# Patient Record
Sex: MALE | Race: ASIAN | URBAN - METROPOLITAN AREA
[De-identification: names, ages, dates, MRNs, and addresses within clinical notes are randomized per-mention and may not be internally consistent; named-entity substitution may affect disease eponyms.]

---

## 2017-06-20 ENCOUNTER — HOSPITAL ENCOUNTER (OUTPATIENT)
Facility: CLINIC | Age: 22
Discharge: HOME | End: 2017-06-20
Attending: EMERGENCY MEDICINE | Admitting: SURGERY
Payer: COMMERCIAL

## 2017-06-20 ENCOUNTER — ANESTHESIA (OUTPATIENT)
Dept: SURGERY | Facility: CLINIC | Age: 22
End: 2017-06-20
Payer: COMMERCIAL

## 2017-06-20 ENCOUNTER — APPOINTMENT (OUTPATIENT)
Dept: ULTRASOUND IMAGING | Facility: CLINIC | Age: 22
End: 2017-06-20
Attending: EMERGENCY MEDICINE
Payer: COMMERCIAL

## 2017-06-20 ENCOUNTER — ANESTHESIA EVENT (OUTPATIENT)
Dept: SURGERY | Facility: CLINIC | Age: 22
End: 2017-06-20
Payer: COMMERCIAL

## 2017-06-20 VITALS
BODY MASS INDEX: 20.35 KG/M2 | SYSTOLIC BLOOD PRESSURE: 120 MMHG | HEART RATE: 60 BPM | RESPIRATION RATE: 16 BRPM | TEMPERATURE: 98.2 F | WEIGHT: 137.38 LBS | OXYGEN SATURATION: 97 % | DIASTOLIC BLOOD PRESSURE: 76 MMHG | HEIGHT: 69 IN

## 2017-06-20 DIAGNOSIS — K35.80 ACUTE APPENDICITIS, UNSPECIFIED ACUTE APPENDICITIS TYPE: ICD-10-CM

## 2017-06-20 LAB
ALBUMIN SERPL-MCNC: 4.7 G/DL (ref 3.4–5)
ALBUMIN UR-MCNC: NEGATIVE MG/DL
ALP SERPL-CCNC: 68 U/L (ref 65–260)
ALT SERPL W P-5'-P-CCNC: 52 U/L (ref 0–50)
AMORPH CRY #/AREA URNS HPF: ABNORMAL /HPF
ANION GAP SERPL CALCULATED.3IONS-SCNC: 11 MMOL/L (ref 3–14)
APPEARANCE UR: ABNORMAL
AST SERPL W P-5'-P-CCNC: 17 U/L (ref 0–35)
BASOPHILS # BLD AUTO: 0 10E9/L (ref 0–0.2)
BASOPHILS NFR BLD AUTO: 0.2 %
BILIRUB SERPL-MCNC: 0.6 MG/DL (ref 0.2–1.3)
BILIRUB UR QL STRIP: NEGATIVE
BUN SERPL-MCNC: 17 MG/DL (ref 7–30)
CALCIUM SERPL-MCNC: 9.5 MG/DL (ref 8.5–10.1)
CHLORIDE SERPL-SCNC: 103 MMOL/L (ref 98–110)
CO2 SERPL-SCNC: 26 MMOL/L (ref 20–32)
COLOR UR AUTO: YELLOW
CREAT SERPL-MCNC: 0.83 MG/DL (ref 0.5–1)
DIFFERENTIAL METHOD BLD: ABNORMAL
EOSINOPHIL # BLD AUTO: 0.1 10E9/L (ref 0–0.7)
EOSINOPHIL NFR BLD AUTO: 0.6 %
ERYTHROCYTE [DISTWIDTH] IN BLOOD BY AUTOMATED COUNT: 11.6 % (ref 10–15)
GFR SERPL CREATININE-BSD FRML MDRD: ABNORMAL ML/MIN/1.7M2
GLUCOSE SERPL-MCNC: 115 MG/DL (ref 70–99)
GLUCOSE UR STRIP-MCNC: NEGATIVE MG/DL
HCT VFR BLD AUTO: 45.4 % (ref 40–53)
HGB BLD-MCNC: 16.5 G/DL (ref 13.3–17.7)
HGB UR QL STRIP: NEGATIVE
IMM GRANULOCYTES # BLD: 0 10E9/L (ref 0–0.4)
IMM GRANULOCYTES NFR BLD: 0.2 %
KETONES UR STRIP-MCNC: 10 MG/DL
LEUKOCYTE ESTERASE UR QL STRIP: NEGATIVE
LIPASE SERPL-CCNC: 121 U/L (ref 73–393)
LYMPHOCYTES # BLD AUTO: 2.1 10E9/L (ref 0.8–5.3)
LYMPHOCYTES NFR BLD AUTO: 14.6 %
MCH RBC QN AUTO: 30.9 PG (ref 26.5–33)
MCHC RBC AUTO-ENTMCNC: 36.3 G/DL (ref 31.5–36.5)
MCV RBC AUTO: 85 FL (ref 78–100)
MONOCYTES # BLD AUTO: 0.8 10E9/L (ref 0–1.3)
MONOCYTES NFR BLD AUTO: 5.7 %
MUCOUS THREADS #/AREA URNS LPF: PRESENT /LPF
NEUTROPHILS # BLD AUTO: 11.4 10E9/L (ref 1.6–8.3)
NEUTROPHILS NFR BLD AUTO: 78.7 %
NITRATE UR QL: NEGATIVE
NRBC # BLD AUTO: 0 10*3/UL
NRBC BLD AUTO-RTO: 0 /100
PH UR STRIP: 6 PH (ref 5–7)
PLATELET # BLD AUTO: 196 10E9/L (ref 150–450)
POTASSIUM SERPL-SCNC: 3.5 MMOL/L (ref 3.4–5.3)
PROT SERPL-MCNC: 8.2 G/DL (ref 6.8–8.8)
RBC # BLD AUTO: 5.34 10E12/L (ref 4.4–5.9)
RBC #/AREA URNS AUTO: 1 /HPF (ref 0–2)
SODIUM SERPL-SCNC: 140 MMOL/L (ref 133–144)
SP GR UR STRIP: 1.02 (ref 1–1.03)
URN SPEC COLLECT METH UR: ABNORMAL
UROBILINOGEN UR STRIP-MCNC: NORMAL MG/DL (ref 0–2)
WBC # BLD AUTO: 14.5 10E9/L (ref 4–11)
WBC #/AREA URNS AUTO: 1 /HPF (ref 0–2)

## 2017-06-20 PROCEDURE — 25000128 H RX IP 250 OP 636: Performed by: EMERGENCY MEDICINE

## 2017-06-20 PROCEDURE — 25000125 ZZHC RX 250: Performed by: ANESTHESIOLOGY

## 2017-06-20 PROCEDURE — S0020 INJECTION, BUPIVICAINE HYDRO: HCPCS | Performed by: SURGERY

## 2017-06-20 PROCEDURE — C9399 UNCLASSIFIED DRUGS OR BIOLOG: HCPCS | Performed by: NURSE ANESTHETIST, CERTIFIED REGISTERED

## 2017-06-20 PROCEDURE — 83690 ASSAY OF LIPASE: CPT | Performed by: EMERGENCY MEDICINE

## 2017-06-20 PROCEDURE — 25000132 ZZH RX MED GY IP 250 OP 250 PS 637: Performed by: EMERGENCY MEDICINE

## 2017-06-20 PROCEDURE — 40000170 ZZH STATISTIC PRE-PROCEDURE ASSESSMENT II: Performed by: SURGERY

## 2017-06-20 PROCEDURE — 25000128 H RX IP 250 OP 636: Performed by: NURSE ANESTHETIST, CERTIFIED REGISTERED

## 2017-06-20 PROCEDURE — 71000014 ZZH RECOVERY PHASE 1 LEVEL 2 FIRST HR: Performed by: SURGERY

## 2017-06-20 PROCEDURE — 27210794 ZZH OR GENERAL SUPPLY STERILE: Performed by: SURGERY

## 2017-06-20 PROCEDURE — 88304 TISSUE EXAM BY PATHOLOGIST: CPT | Mod: 26 | Performed by: SURGERY

## 2017-06-20 PROCEDURE — 85025 COMPLETE CBC W/AUTO DIFF WBC: CPT | Performed by: EMERGENCY MEDICINE

## 2017-06-20 PROCEDURE — 88304 TISSUE EXAM BY PATHOLOGIST: CPT | Performed by: SURGERY

## 2017-06-20 PROCEDURE — 81001 URINALYSIS AUTO W/SCOPE: CPT | Performed by: EMERGENCY MEDICINE

## 2017-06-20 PROCEDURE — 80053 COMPREHEN METABOLIC PANEL: CPT | Performed by: EMERGENCY MEDICINE

## 2017-06-20 PROCEDURE — 36000057 ZZH SURGERY LEVEL 3 1ST 30 MIN - UMMC: Performed by: SURGERY

## 2017-06-20 PROCEDURE — 37000008 ZZH ANESTHESIA TECHNICAL FEE, 1ST 30 MIN: Performed by: SURGERY

## 2017-06-20 PROCEDURE — 25000566 ZZH SEVOFLURANE, EA 15 MIN: Performed by: SURGERY

## 2017-06-20 PROCEDURE — 71000027 ZZH RECOVERY PHASE 2 EACH 15 MINS: Performed by: SURGERY

## 2017-06-20 PROCEDURE — 99285 EMERGENCY DEPT VISIT HI MDM: CPT | Mod: 25 | Performed by: EMERGENCY MEDICINE

## 2017-06-20 PROCEDURE — 25000125 ZZHC RX 250: Performed by: SURGERY

## 2017-06-20 PROCEDURE — 36000059 ZZH SURGERY LEVEL 3 EA 15 ADDTL MIN UMMC: Performed by: SURGERY

## 2017-06-20 PROCEDURE — 96360 HYDRATION IV INFUSION INIT: CPT | Performed by: EMERGENCY MEDICINE

## 2017-06-20 PROCEDURE — 96361 HYDRATE IV INFUSION ADD-ON: CPT | Mod: XU | Performed by: EMERGENCY MEDICINE

## 2017-06-20 PROCEDURE — 37000009 ZZH ANESTHESIA TECHNICAL FEE, EACH ADDTL 15 MIN: Performed by: SURGERY

## 2017-06-20 PROCEDURE — 25000128 H RX IP 250 OP 636: Performed by: SURGERY

## 2017-06-20 PROCEDURE — 76705 ECHO EXAM OF ABDOMEN: CPT

## 2017-06-20 PROCEDURE — 99285 EMERGENCY DEPT VISIT HI MDM: CPT | Mod: Z6 | Performed by: EMERGENCY MEDICINE

## 2017-06-20 PROCEDURE — 25000125 ZZHC RX 250: Performed by: NURSE ANESTHETIST, CERTIFIED REGISTERED

## 2017-06-20 RX ORDER — MEPERIDINE HYDROCHLORIDE 25 MG/ML
12.5 INJECTION INTRAMUSCULAR; INTRAVENOUS; SUBCUTANEOUS
Status: DISCONTINUED | OUTPATIENT
Start: 2017-06-20 | End: 2017-06-20 | Stop reason: HOSPADM

## 2017-06-20 RX ORDER — IBUPROFEN 600 MG/1
600 TABLET, FILM COATED ORAL ONCE
Status: COMPLETED | OUTPATIENT
Start: 2017-06-20 | End: 2017-06-20

## 2017-06-20 RX ORDER — ONDANSETRON 4 MG/1
4 TABLET, ORALLY DISINTEGRATING ORAL EVERY 30 MIN PRN
Status: DISCONTINUED | OUTPATIENT
Start: 2017-06-20 | End: 2017-06-20 | Stop reason: HOSPADM

## 2017-06-20 RX ORDER — FENTANYL CITRATE 50 UG/ML
25-50 INJECTION, SOLUTION INTRAMUSCULAR; INTRAVENOUS
Status: DISCONTINUED | OUTPATIENT
Start: 2017-06-20 | End: 2017-06-20 | Stop reason: HOSPADM

## 2017-06-20 RX ORDER — DEXAMETHASONE SODIUM PHOSPHATE 4 MG/ML
INJECTION, SOLUTION INTRA-ARTICULAR; INTRALESIONAL; INTRAMUSCULAR; INTRAVENOUS; SOFT TISSUE PRN
Status: DISCONTINUED | OUTPATIENT
Start: 2017-06-20 | End: 2017-06-20

## 2017-06-20 RX ORDER — CEFAZOLIN SODIUM 2 G/100ML
2 INJECTION, SOLUTION INTRAVENOUS
Status: COMPLETED | OUTPATIENT
Start: 2017-06-20 | End: 2017-06-20

## 2017-06-20 RX ORDER — NALOXONE HYDROCHLORIDE 0.4 MG/ML
.1-.4 INJECTION, SOLUTION INTRAMUSCULAR; INTRAVENOUS; SUBCUTANEOUS
Status: DISCONTINUED | OUTPATIENT
Start: 2017-06-20 | End: 2017-06-20 | Stop reason: HOSPADM

## 2017-06-20 RX ORDER — BUPIVACAINE HYDROCHLORIDE 5 MG/ML
INJECTION, SOLUTION PERINEURAL PRN
Status: DISCONTINUED | OUTPATIENT
Start: 2017-06-20 | End: 2017-06-20 | Stop reason: HOSPADM

## 2017-06-20 RX ORDER — LIDOCAINE HYDROCHLORIDE 20 MG/ML
INJECTION, SOLUTION INFILTRATION; PERINEURAL PRN
Status: DISCONTINUED | OUTPATIENT
Start: 2017-06-20 | End: 2017-06-20

## 2017-06-20 RX ORDER — PROPOFOL 10 MG/ML
INJECTION, EMULSION INTRAVENOUS PRN
Status: DISCONTINUED | OUTPATIENT
Start: 2017-06-20 | End: 2017-06-20

## 2017-06-20 RX ORDER — ONDANSETRON 2 MG/ML
INJECTION INTRAMUSCULAR; INTRAVENOUS PRN
Status: DISCONTINUED | OUTPATIENT
Start: 2017-06-20 | End: 2017-06-20

## 2017-06-20 RX ORDER — CEFAZOLIN SODIUM 1 G/3ML
1 INJECTION, POWDER, FOR SOLUTION INTRAMUSCULAR; INTRAVENOUS SEE ADMIN INSTRUCTIONS
Status: DISCONTINUED | OUTPATIENT
Start: 2017-06-20 | End: 2017-06-20 | Stop reason: HOSPADM

## 2017-06-20 RX ORDER — SODIUM CHLORIDE, SODIUM LACTATE, POTASSIUM CHLORIDE, CALCIUM CHLORIDE 600; 310; 30; 20 MG/100ML; MG/100ML; MG/100ML; MG/100ML
INJECTION, SOLUTION INTRAVENOUS CONTINUOUS PRN
Status: DISCONTINUED | OUTPATIENT
Start: 2017-06-20 | End: 2017-06-20

## 2017-06-20 RX ORDER — KETOROLAC TROMETHAMINE 30 MG/ML
INJECTION, SOLUTION INTRAMUSCULAR; INTRAVENOUS PRN
Status: DISCONTINUED | OUTPATIENT
Start: 2017-06-20 | End: 2017-06-20

## 2017-06-20 RX ORDER — SODIUM CHLORIDE, SODIUM LACTATE, POTASSIUM CHLORIDE, CALCIUM CHLORIDE 600; 310; 30; 20 MG/100ML; MG/100ML; MG/100ML; MG/100ML
INJECTION, SOLUTION INTRAVENOUS CONTINUOUS
Status: DISCONTINUED | OUTPATIENT
Start: 2017-06-20 | End: 2017-06-20 | Stop reason: HOSPADM

## 2017-06-20 RX ORDER — HYDROCODONE BITARTRATE AND ACETAMINOPHEN 5; 325 MG/1; MG/1
1-2 TABLET ORAL EVERY 4 HOURS PRN
Qty: 20 TABLET | Refills: 0 | Status: SHIPPED | OUTPATIENT
Start: 2017-06-20

## 2017-06-20 RX ORDER — ONDANSETRON 2 MG/ML
4 INJECTION INTRAMUSCULAR; INTRAVENOUS EVERY 30 MIN PRN
Status: DISCONTINUED | OUTPATIENT
Start: 2017-06-20 | End: 2017-06-20 | Stop reason: HOSPADM

## 2017-06-20 RX ORDER — FENTANYL CITRATE 50 UG/ML
50 INJECTION, SOLUTION INTRAMUSCULAR; INTRAVENOUS
Status: COMPLETED | OUTPATIENT
Start: 2017-06-20 | End: 2017-06-20

## 2017-06-20 RX ORDER — OXYCODONE HYDROCHLORIDE 5 MG/1
5-10 TABLET ORAL
Status: DISCONTINUED | OUTPATIENT
Start: 2017-06-20 | End: 2017-06-20 | Stop reason: HOSPADM

## 2017-06-20 RX ORDER — SODIUM CHLORIDE 9 MG/ML
1000 INJECTION, SOLUTION INTRAVENOUS CONTINUOUS
Status: DISCONTINUED | OUTPATIENT
Start: 2017-06-20 | End: 2017-06-20 | Stop reason: HOSPADM

## 2017-06-20 RX ORDER — FENTANYL CITRATE 50 UG/ML
INJECTION, SOLUTION INTRAMUSCULAR; INTRAVENOUS PRN
Status: DISCONTINUED | OUTPATIENT
Start: 2017-06-20 | End: 2017-06-20

## 2017-06-20 RX ADMIN — Medication 5 MG: at 13:12

## 2017-06-20 RX ADMIN — FENTANYL CITRATE 50 MCG: 50 INJECTION, SOLUTION INTRAMUSCULAR; INTRAVENOUS at 12:08

## 2017-06-20 RX ADMIN — ONDANSETRON 4 MG: 2 INJECTION INTRAMUSCULAR; INTRAVENOUS at 13:19

## 2017-06-20 RX ADMIN — LIDOCAINE HYDROCHLORIDE 100 MG: 20 INJECTION, SOLUTION INFILTRATION; PERINEURAL at 13:12

## 2017-06-20 RX ADMIN — SUGAMMADEX 200 MG: 100 INJECTION, SOLUTION INTRAVENOUS at 13:54

## 2017-06-20 RX ADMIN — CEFAZOLIN SODIUM 2 G: 2 INJECTION, SOLUTION INTRAVENOUS at 13:15

## 2017-06-20 RX ADMIN — IBUPROFEN 600 MG: 600 TABLET ORAL at 08:41

## 2017-06-20 RX ADMIN — SODIUM CHLORIDE, POTASSIUM CHLORIDE, SODIUM LACTATE AND CALCIUM CHLORIDE: 600; 310; 30; 20 INJECTION, SOLUTION INTRAVENOUS at 13:41

## 2017-06-20 RX ADMIN — DEXAMETHASONE SODIUM PHOSPHATE 10 MG: 4 INJECTION, SOLUTION INTRAMUSCULAR; INTRAVENOUS at 13:19

## 2017-06-20 RX ADMIN — SODIUM CHLORIDE, POTASSIUM CHLORIDE, SODIUM LACTATE AND CALCIUM CHLORIDE: 600; 310; 30; 20 INJECTION, SOLUTION INTRAVENOUS at 13:00

## 2017-06-20 RX ADMIN — SODIUM CHLORIDE 1000 ML: 9 INJECTION, SOLUTION INTRAVENOUS at 10:50

## 2017-06-20 RX ADMIN — PROPOFOL 200 MG: 10 INJECTION, EMULSION INTRAVENOUS at 13:13

## 2017-06-20 RX ADMIN — SODIUM CHLORIDE 1000 ML: 9 INJECTION, SOLUTION INTRAVENOUS at 08:41

## 2017-06-20 RX ADMIN — KETOROLAC TROMETHAMINE 30 MG: 30 INJECTION, SOLUTION INTRAMUSCULAR at 13:36

## 2017-06-20 RX ADMIN — Medication 25 MG: at 13:27

## 2017-06-20 RX ADMIN — MIDAZOLAM HYDROCHLORIDE 1 MG: 1 INJECTION, SOLUTION INTRAMUSCULAR; INTRAVENOUS at 13:12

## 2017-06-20 RX ADMIN — FENTANYL CITRATE 150 MCG: 50 INJECTION, SOLUTION INTRAMUSCULAR; INTRAVENOUS at 13:29

## 2017-06-20 RX ADMIN — MIDAZOLAM HYDROCHLORIDE 1 MG: 1 INJECTION, SOLUTION INTRAMUSCULAR; INTRAVENOUS at 13:07

## 2017-06-20 RX ADMIN — FENTANYL CITRATE 50 MCG: 50 INJECTION, SOLUTION INTRAMUSCULAR; INTRAVENOUS at 12:09

## 2017-06-20 ASSESSMENT — ENCOUNTER SYMPTOMS
MUSCULOSKELETAL NEGATIVE: 1
SHORTNESS OF BREATH: 0
VOMITING: 0
ABDOMINAL PAIN: 1
HEADACHES: 0
FEVER: 0
PSYCHIATRIC NEGATIVE: 1
NAUSEA: 0
EYES NEGATIVE: 1

## 2017-06-20 NOTE — IP AVS SNAPSHOT
MRN:[id number]                      After Visit Summary   6/20/2017    Elena Salguero    MRN: [id number]           Thank you!     Thank you for choosing [facility] for your care. Our goal is always to provide you with excellent care. Hearing back from our patients is one way we can continue to improve our services. Please take a few minutes to complete the written survey that you may receive in the mail after you visit with us. Thank you!        Patient Information     Date Of Birth          1995        About your hospital stay     You were admitted on:  N/A You last received care in the:  [facility] OR    You were discharged on:  June 20, 2017       Who to Call     For medical emergencies, please call 911.  For non-urgent questions about your medical care, please call your primary care provider or clinic, [phone]  For questions related to your surgery, please call your surgery clinic        Attending Provider     Provider Shayna Cedillo MD Emergency Medicine       Primary Care Provider Office Phone # Fax #    Kalie Redmond [phone]      After Care Instructions     Discharge Instructions       Follow up appointment as instructed by Surgeon and or RN, expect a call within 2-4 business days for post op follow up plan            No lifting       No lifting over 20 lbs and no strenuous physical activity for 3 weeks            Shower       No shower for 24 hours post procedure. May shower Postoperative Day (POD)  1                  Further instructions from your care team       [facility]  Same-Day Surgery   Adult Discharge Orders & Instructions     For 24 hours after surgery    1. Get plenty of rest.  A responsible adult must stay with you for at least 24 hours after you leave the hospital.   2. Do not drive or use heavy equipment.  If you have weakness or tingling, don't drive or use heavy equipment until this  feeling goes away.  3. Do not drink alcohol.  4. Avoid strenuous or risky activities.  Ask for help when climbing stairs.   5. You may feel lightheaded.  IF so, sit for a few minutes before standing.  Have someone help you get up.   6. If you have nausea (feel sick to your stomach): Drink only clear liquids such as apple juice, ginger ale, broth or 7-Up.  Rest may also help.  Be sure to drink enough fluids.  Move to a regular diet as you feel able.  7. You may have a slight fever. Call the doctor if your fever is over 100 F (37.7 C) (taken under the tongue) or lasts longer than 24 hours.  8. You may have a dry mouth, a sore throat, muscle aches or trouble sleeping.  These should go away after 24 hours.  9. Do not make important or legal decisions.   Call your doctor for any of the followin.  Signs of infection (fever, growing tenderness at the surgery site, a large amount of drainage or bleeding, severe pain, foul-smelling drainage, redness, swelling).    2. It has been over 8 to 10 hours since surgery and you are still not able to urinate (pass water).    3.  Headache for over 24 hours.    4.  Numbness, tingling or weakness the day after surgery (if you had spinal anesthesia).  To contact a doctor, call ________________________________________ or:        [phone] and ask for the resident on call for   ______________________________________________ (answered 24 hours a day)      Emergency Department:    [facility]: [phone]       (TTY for hearing impaired: [phone])    [facility]: [phone]       (TTY for hearing impaired: [phone])    Discharge Instructions: Following a Laparoscopy    Comfort:    The amount of discomfort you can expect is very unpredictable.     If you have pain that cannot be controlled with non aspirin medication or with the prescription medication you may have received, you should notify your physician.     You May Experience:    Abdominal tenderness or  "abdominal cramping    Low back ache or discomfort radiating to your shoulders, chest, back or neck. This is a result of the gas used to inflate your abdomen during surgery. This gas is absorbed in 24 to 36 hours. The \"knee chest\" position will help relieve this discomfort.    Black and blue marks (bruising) on your abdomen from the instruments used during the surgery.     Drainage:    You may expect a small amount of drainage from the incision on your abdomen and you may change the bandage when necessary.    If the drainage increases or does not stop by holding pressure on the site for a few minutes, call your surgeon's office or go to the Emergency Room.    Home Activity:    The day of surgery spend a quiet day at home.    Increase activity as tolerated.    You may bathe or shower, do not soak in bath tub or scrub incisions.    You have no restrictions on your diet. Following surgery, drink plenty of fluids and eat a light meal.    The anesthesia may produce some nausea. If you feel nauseated, stay in bed, keep your head down and try drinking fluids such as Seven-Up, tea or soup.    Notify Physician at Once If:    You have a fever over 100 degrees. A low grade fever (under 100 degrees) can be common after surgery.    You have severe pain that is not controlled with pain medications prescribed by your surgeon.    You have a large amount of bleeding or drainage.    Follow up for a post operative check as directed by your surgeon.           Pending Results     No orders found from 6/18/2017 to 6/21/2017.            Admission Information     Date & Time Department Dept. Phone    6/20/2017  MAIN [phone]      Your Vitals Were     Blood Pressure Pulse Temperature Respirations Height Weight    126/84 60 97.1  F (36.2  C) (Oral) 16 1.753 m (5' 9\") 62.3 kg (137 lb 6 oz)    Pulse Oximetry BMI (Body Mass Index)                99% 20.29 kg/m2          [employer] Information     [employer] lets you send messages to your " "doctor, view your test results, renew your prescriptions, schedule appointments and more. To sign up, go to [email or web address] . Click on \"Log in\" on the left side of the screen, which will take you to the Welcome page. Then click on \"Sign up Now\" on the right side of the page.     You will be asked to enter the access code listed below, as well as some personal information. Please follow the directions to create your username and password.     Your access code is: [email or web address]  Expires: 2017  2:02 PM     Your access code will  in 90 days. If you need help or a new code, please call your [place] clinic or [phone].        Care EveryWhere ID     This is your Care EveryWhere ID. This could be used by other organizations to access your [place] medical records  [id number]           Review of your medicines      START taking        Dose / Directions    HYDROcodone-acetaminophen 5-325 MG per tablet   Commonly known as:  NORCO   Used for:  Acute appendicitis, unspecified acute appendicitis type        Dose:  1-2 tablet   Take 1-2 tablets by mouth every 4 hours as needed for other (Moderate to Severe Pain)   Quantity:  20 tablet   Refills:  0            Where to get your medicines      Some of these will need a paper prescription and others can be bought over the counter. Ask your nurse if you have questions.     Bring a paper prescription for each of these medications     HYDROcodone-acetaminophen 5-325 MG per tablet                Protect others around you: Learn how to safely use, store and throw away your medicines at www.disposemymeds.org.             Medication List: This is a list of all your medications and when to take them. Check marks below indicate your daily home schedule. Keep this list as a reference.      Medications           Morning Afternoon Evening Bedtime As Needed    HYDROcodone-acetaminophen 5-325 MG per tablet   Commonly known as:  NORCO   Take 1-2 tablets by mouth every 4 " hours as needed for other (Moderate to Severe Pain)

## 2017-06-20 NOTE — ED PROVIDER NOTES
"  History     Chief Complaint   Patient presents with     Abdominal Pain     HPI  Elena Zepeda is a 21 year old male from [facility] who presents with stomach pain.  The pain is around his belly button and started about 9 PM.  He has no associated symptoms such as nausea, vomiting, or fever.  The pain started around his belly button is still there.  He states he would eat if he were offered something.  He is fasting for Ramadan and according to his father, he does not eat or drink anything between 3:30 AM and 9 PM daily during this time.  The patient had a hard bowel movement at 3 AM but denies diarrhea.  Nothing particular makes his pain better or worse.  He is unable to describe the pain other than it is always there and may be it is sharp.  He has never had pain like this in the past.  He denies chest pain or shortness of breath.    Social: Patient is here with his father.  He is from [facility].  He does not smoke.  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      I have reviewed the Medications, Allergies, Past Medical and Surgical History, and Social History in the Epic system.    Review of Systems   Constitutional: Negative for fever.   Eyes: Negative.    Respiratory: Negative for shortness of breath.    Cardiovascular: Negative for chest pain.   Gastrointestinal: Positive for abdominal pain. Negative for nausea and vomiting.   Genitourinary: Negative.    Musculoskeletal: Negative.    Skin: Negative for rash.   Neurological: Negative for headaches.   Psychiatric/Behavioral: Negative.    All other systems reviewed and are negative.      Physical Exam   BP: 137/75  Pulse: 55  Temp: 97.9  F (36.6  C)  Resp: 16  Height: 175.3 cm (5' 9\")  Weight: 62.3 kg (137 lb 6 oz)  SpO2: 99 %  Physical Exam  Physical Exam   Constitutional:   well nourished, well developed, resting comfortably   HENT:   Head: Normocephalic and atraumatic.   Eyes: Conjunctivae are normal. Pupils are equal, round, and reactive to light.    " pharynx has no erythema or exudate, mucous membranes are moist  Neck:   no adenopathy, no bony tenderness  Cardiovascular: regular rate and rhythm without murmurs or gallops  Pulmonary/Chest: Clear to auscultation bilaterally, with no wheezes or retractions. No respiratory distress.  GI: Soft with good bowel sounds.  Periumbilical tenderness, non-distended, with no guarding, no rebound, no peritoneal signs.  No right lower quadrant abdominal pain.  Back:  No bony or CVA tenderness   Musculoskeletal:  no edema or clubbing   Skin: Skin is warm and dry. No rash noted.   Neurological: alert and oriented to person, place, and time. Nonfocal exam  Psychiatric:  normal mood and affect.   ED Course     ED Course     Procedures             Critical Care time:  none             Results for orders placed or performed during the hospital encounter of 06/20/17 (from the past 24 hour(s))   CBC with platelets differential   Result Value Ref Range    WBC 14.5 (H) 4.0 - 11.0 10e9/L    RBC Count 5.34 4.4 - 5.9 10e12/L    Hemoglobin 16.5 13.3 - 17.7 g/dL    Hematocrit 45.4 40.0 - 53.0 %    MCV 85 78 - 100 fl    MCH 30.9 26.5 - 33.0 pg    MCHC 36.3 31.5 - 36.5 g/dL    RDW 11.6 10.0 - 15.0 %    Platelet Count 196 150 - 450 10e9/L    Diff Method Automated Method     % Neutrophils 78.7 %    % Lymphocytes 14.6 %    % Monocytes 5.7 %    % Eosinophils 0.6 %    % Basophils 0.2 %    % Immature Granulocytes 0.2 %    Nucleated RBCs 0 0 /100    Absolute Neutrophil 11.4 (H) 1.6 - 8.3 10e9/L    Absolute Lymphocytes 2.1 0.8 - 5.3 10e9/L    Absolute Monocytes 0.8 0.0 - 1.3 10e9/L    Absolute Eosinophils 0.1 0.0 - 0.7 10e9/L    Absolute Basophils 0.0 0.0 - 0.2 10e9/L    Abs Immature Granulocytes 0.0 0 - 0.4 10e9/L    Absolute Nucleated RBC 0.0    Comprehensive metabolic panel   Result Value Ref Range    Sodium 140 133 - 144 mmol/L    Potassium 3.5 3.4 - 5.3 mmol/L    Chloride 103 98 - 110 mmol/L    Carbon Dioxide 26 20 - 32 mmol/L    Anion Gap 11 3 -  14 mmol/L    Glucose 115 (H) 70 - 99 mg/dL    Urea Nitrogen 17 7 - 30 mg/dL    Creatinine 0.83 0.50 - 1.00 mg/dL    GFR Estimate >90  Non  GFR Calc   >60 mL/min/1.7m2    GFR Estimate If Black >90   GFR Calc   >60 mL/min/1.7m2    Calcium 9.5 8.5 - 10.1 mg/dL    Bilirubin Total 0.6 0.2 - 1.3 mg/dL    Albumin 4.7 3.4 - 5.0 g/dL    Protein Total 8.2 6.8 - 8.8 g/dL    Alkaline Phosphatase 68 65 - 260 U/L    ALT 52 (H) 0 - 50 U/L    AST 17 0 - 35 U/L   Lipase   Result Value Ref Range    Lipase 121 73 - 393 U/L   UA reflex to Microscopic and Culture   Result Value Ref Range    Color Urine Yellow     Appearance Urine Slightly Cloudy     Glucose Urine Negative NEG mg/dL    Bilirubin Urine Negative NEG    Ketones Urine 10 (A) NEG mg/dL    Specific Gravity Urine 1.017 1.003 - 1.035    Blood Urine Negative NEG    pH Urine 6.0 5.0 - 7.0 pH    Protein Albumin Urine Negative NEG mg/dL    Urobilinogen mg/dL Normal 0.0 - 2.0 mg/dL    Nitrite Urine Negative NEG    Leukocyte Esterase Urine Negative NEG    Source Midstream Urine     RBC Urine 1 0 - 2 /HPF    WBC Urine 1 0 - 2 /HPF    Mucous Urine Present (A) NEG /LPF    Amorphous Crystals Few (A) NEG /HPF   US Abdomen Limited    Narrative    ULTRASOUND ABDOMEN LIMITED  6/20/2017 10:08 AM     HISTORY: Periumbilical pain. Rule out appendicitis.     FINDINGS: Ultrasound evaluation right lower quadrant in area of  patient's pain is performed. A dilated noncompressible fluid and  debris-filled tubular structure is noted in the right lower quadrant  in the area of patient's pain likely representing a dilated appendix.  This measures up to 12 mm in thickness. Trace amount of adjacent fluid  is noted. Shadowing calcified appendicolith is noted near the base of  this inflamed appendix. Findings are consistent with acute  appendicitis.      Impression    IMPRESSION: Acute appendicitis.    NELLIE PARNELL MD      Labs Ordered and Resulted from Time of ED Arrival Up to  the Time of Departure from the ED   CBC WITH PLATELETS DIFFERENTIAL - Abnormal; Notable for the following:        Result Value    WBC 14.5 (*)     Absolute Neutrophil 11.4 (*)     All other components within normal limits   COMPREHENSIVE METABOLIC PANEL - Abnormal; Notable for the following:     Glucose 115 (*)     ALT 52 (*)     All other components within normal limits   UA MACROSCOPIC WITH REFLEX TO MICRO AND CULTURE - Abnormal; Notable for the following:     Ketones Urine 10 (*)     Mucous Urine Present (*)     Amorphous Crystals Few (*)     All other components within normal limits   LIPASE           Medications   0.9% sodium chloride infusion (not administered)   0.9% sodium chloride BOLUS (1,000 mLs Intravenous New Bag 6/20/17 0841)   ibuprofen (ADVIL/MOTRIN) tablet 600 mg (600 mg Oral Given 6/20/17 0841)        Assessments & Plan (with Medical Decision Making)         I have reviewed the nursing notes.  Emergency Department course:  The patient was seen and examined at 0800 am.   The patient has stomach pain that is periumbilical with no associated symptoms.  He has no peritoneal signs.  This could obviously represent early appendicitis; however the patient is quite early in presentation.    I treated him with normal saline IV and ibuprofen po.  He then received normal saline at 125 cc an hour  Laboratory studies show  leukocytosis with a WBC of 14.5.  CBC and comprehensive metabolic panel are otherwise unremarkable apart from a slighlty elevated ALT of 52.  Lipase is 121.  UA is negative with 10 ketones.  I reevaluated the patient at about 9:30 AM.  He continued to have abdominal pain it is located periumbilically.  I obtained an ultrasound to see if this would be able to evaluate for acute appendicitis.  Ultrasound shows a dilated appendix measuring up to 12 mm in thickness with an appendicolith.  This is consistent with acute appendicitis.  I consulted surgery and spoke with Dr. Neri.  He will take the  patient to the OR directly from the emergency department.  The patient was made NPO.  He was taken to the OR at about 1140 am      I have reviewed the findings, diagnosis, plan and need for follow up with the patient.    New Prescriptions    No medications on file       Final diagnoses:   Acute appendicitis, unspecified acute appendicitis type       6/20/2017   [facility] [place], EMERGENCY DEPARTMENT  This note was created in part by the use of Dragon voice recognition dictation system. Inadvertent grammatical errors and typographical errors may still exist.  MD Court Thayer Alda L, MD  06/20/17 8883

## 2017-06-20 NOTE — IP AVS SNAPSHOT
MAIN OR    [address]    [place] [address]    Phone:  [phone]                                       After Visit Summary   6/20/2017    Elena Salguero    MRN: [id number]           After Visit Summary Signature Page     I have received my discharge instructions, and my questions have been answered. I have discussed any challenges I see with this plan with the nurse or doctor.    ..........................................................................................................................................  Patient/Patient Representative Signature      ..........................................................................................................................................  Patient Representative Print Name and Relationship to Patient    ..................................................               ................................................  Date                                            Time    ..........................................................................................................................................  Reviewed by Signature/Title    ...................................................              ..............................................  Date                                                            Time

## 2017-06-20 NOTE — BRIEF OP NOTE
[facility] Brief Operative Note    Pre-operative diagnosis: appendicitis   Post-operative diagnosis Same as Pre-op Dx   Procedure: Procedure(s):  Laparoscopic Appendectomy - Wound Class: III-Contaminated   Surgeon: Ozzy Neri MD   Assistants(s):    Estimated blood loss: Minimal    Specimens: appy   Findings: Acute appy

## 2017-06-20 NOTE — H&P
Patient with 24 hours abdominal pain that localized to the RLQ. Denies N/V. No change in bowel habits.  Past medical surgical history negative. No previous surgeries.  meds none  NKDA  Denies EtoH or tobacco.  Lives with family, her with father.  10 pt ROS negative except for HPI  PHYSICAL EXAM  General appearance- healthy, alert, and in no distress.  Skin- Skin color, texture, and turgor normal.  No rashes.  Neck- Neck is supple with no obvious adenopathy.  Lungs- Respiratory effort unlabored.  Gait- Normal.  Abdomen - tender RLQ with rebound.    WBC 14.5  CT abdomen appendicitis    Impression: Appendicitis in healthy 22 y/o patient  Recommend: lap appy under GA  Today discussed alternatives, risks, goals, potential complications for lap appy. Patient understood had opportunity for questions and gives consent to proceed.  NPO, routine orders.

## 2017-06-20 NOTE — ANESTHESIA CARE TRANSFER NOTE
Patient: Elena Salguero    Procedure(s):  Laparoscopic Appendectomy - Wound Class: III-Contaminated    Diagnosis: appendicitis  Diagnosis Additional Information: No value filed.    Anesthesia Type:   General, RSI     Note:  Airway :Face Mask  Patient transferred to:PACU  Comments: Report to KELLY Velásquez.  Pt opening eyes.  119/64  112  `16, clear, SaO2  100 %   Temp 36.8 C  O2 FM      Vitals: (Last set prior to Anesthesia Care Transfer)    CRNA VITALS  6/20/2017 1348 - 6/20/2017 1426      6/20/2017             EKG: Sinus rhythm                Electronically Signed By: ROBERTO Hodgson CRNA  June 20, 2017  2:26 PM

## 2017-06-20 NOTE — DISCHARGE INSTRUCTIONS
[facility]  Same-Day Surgery   Adult Discharge Orders & Instructions     For 24 hours after surgery    1. Get plenty of rest.  A responsible adult must stay with you for at least 24 hours after you leave the hospital.   2. Do not drive or use heavy equipment.  If you have weakness or tingling, don't drive or use heavy equipment until this feeling goes away.  3. Do not drink alcohol.  4. Avoid strenuous or risky activities.  Ask for help when climbing stairs.   5. You may feel lightheaded.  IF so, sit for a few minutes before standing.  Have someone help you get up.   6. If you have nausea (feel sick to your stomach): Drink only clear liquids such as apple juice, ginger ale, broth or 7-Up.  Rest may also help.  Be sure to drink enough fluids.  Move to a regular diet as you feel able.  7. You may have a slight fever. Call the doctor if your fever is over 100 F (37.7 C) (taken under the tongue) or lasts longer than 24 hours.  8. You may have a dry mouth, a sore throat, muscle aches or trouble sleeping.  These should go away after 24 hours.  9. Do not make important or legal decisions.   Call your doctor for any of the followin.  Signs of infection (fever, growing tenderness at the surgery site, a large amount of drainage or bleeding, severe pain, foul-smelling drainage, redness, swelling).    2. It has been over 8 to 10 hours since surgery and you are still not able to urinate (pass water).    3.  Headache for over 24 hours.    4.  Numbness, tingling or weakness the day after surgery (if you had spinal anesthesia).  To contact a doctor, call ________________________________________ or:        [phone] and ask for the resident on call for   ______________________________________________ (answered 24 hours a day)      Emergency Department:    [facility]: [phone]       (TTY for hearing impaired: [phone])    [facility]: [phone]       (TTY for  "hearing impaired: [phone])    Discharge Instructions: Following a Laparoscopy    Comfort:    The amount of discomfort you can expect is very unpredictable.     If you have pain that cannot be controlled with non aspirin medication or with the prescription medication you may have received, you should notify your physician.     You May Experience:    Abdominal tenderness or abdominal cramping    Low back ache or discomfort radiating to your shoulders, chest, back or neck. This is a result of the gas used to inflate your abdomen during surgery. This gas is absorbed in 24 to 36 hours. The \"knee chest\" position will help relieve this discomfort.    Black and blue marks (bruising) on your abdomen from the instruments used during the surgery.     Drainage:    You may expect a small amount of drainage from the incision on your abdomen and you may change the bandage when necessary.    If the drainage increases or does not stop by holding pressure on the site for a few minutes, call your surgeon's office or go to the Emergency Room.    Home Activity:    The day of surgery spend a quiet day at home.    Increase activity as tolerated.    You may bathe or shower, do not soak in bath tub or scrub incisions.    You have no restrictions on your diet. Following surgery, drink plenty of fluids and eat a light meal.    The anesthesia may produce some nausea. If you feel nauseated, stay in bed, keep your head down and try drinking fluids such as Seven-Up, tea or soup.    Notify Physician at Once If:    You have a fever over 100 degrees. A low grade fever (under 100 degrees) can be common after surgery.    You have severe pain that is not controlled with pain medications prescribed by your surgeon.    You have a large amount of bleeding or drainage.    Follow up for a post operative check as directed by your surgeon.         "

## 2017-06-20 NOTE — ANESTHESIA PREPROCEDURE EVALUATION
"  Anesthesia Evaluation     . Pt has not had prior anesthetic            ROS/MED HX    ENT/Pulmonary:       Neurologic:       Cardiovascular:         METS/Exercise Tolerance:     Hematologic:         Musculoskeletal:         GI/Hepatic:         Renal/Genitourinary:         Endo:         Psychiatric:         Infectious Disease:         Malignancy:         Other:                     Physical Exam  Normal systems: cardiovascular, pulmonary and dental    Airway   Mallampati: II  TM distance: >3 FB  Neck ROM: full    Dental     Cardiovascular       Pulmonary                     Anesthesia Plan      History & Physical Review  History and physical reviewed and following examination; no interval change.    ASA Status:  1 emergent.    NPO Status:  > 8 hours    Plan for General and RSI with Propofol induction. Maintenance will be Inhalation.         Procedure:   LAPAROSCOPIC APPENDECTOMY (N/A Abdomen) Laparoscopic Appendectomy      Anesthesia type: General    Pre-op diagnosis: appendicitis    [facility]: [facility]    Surgeon: Ozzy Neri MD     NPO after 0300 - milk and tea. No N/V.  Pain 5/10  Fentanyl 100 mcq ordered    Plan: RSI. GAET.        Postoperative Care      Consents  Anesthetic plan, risks, benefits and alternatives discussed with:  Patient..        Elena Salguero [[id number]]  Male - 21 year old - [birth year]  LAPAROSCOPIC APPENDECTOMY (N/A Abdomen)       Preprocedure Vitals      BP:   137/75   Pulse:   55   Resp:   16   SpO2:   99   Temp:   36.6  C (97.9  F)   Height:   1.753 m (5' 9\")  (06/20/17)   Weight:   62.3 kg (137 lb 6 oz)  (06/20/17)   BMI:   20.33   IBW:   70.7 kg (155 lb 15.1 oz)   Last edited 06/20/17 0745 by RI          Allergies      No Known Allergies       Prescription Medications      No medications found       Hematology Labs        WBC        14.5  06/20/17 0833       Electrolyte Labs        K+    Na+        3.5 06/20/17 0833 140 06/20/17 0833       Other Labs        DIOR    " ALT    AST        0.6 06/20/17 0833 52  06/20/17 0833 17 06/20/17 0833        Substance History      Smoking Status: Never Smoker     Smokeless Tobacco Status: Unknown     Alcohol use: No     Drug use: No       Facility Administered Medications      0.9% sodium chloride infusion           Anesthesia Family History      No family medical history recorded       Problem List      No problems recorded       Medical History        No medical history recorded       Surgical History      No surgical history recorded          Last Written Preprocedure Note      ED Provider Notes  Date of Service: 6/20/2017 7:44 AM  Creation Time: 6/20/2017 7:59 AM  Shayna Yun MD   Emergency Medicine      []Hide copied text  []Hover for attribution information     History          Chief Complaint   Patient presents with     Abdominal Pain      HPI  Elena Zepeda is a 21 year old male from [facility] who presents with stomach pain.  The pain is around his belly button and started about 9 PM.  He has no associated symptoms such as nausea, vomiting, or fever.  The pain started around his belly button is still there.  He states he would eat if he were offered something.  He is fasting for Ramadan and according to his father, he does not eat or drink anything between 3:30 AM and 9 PM daily during this time.  The patient had a hard bowel movement at 3 AM but denies diarrhea.  Nothing particular makes his pain better or worse.  He is unable to describe the pain other than it is always there and may be it is sharp.  He has never had pain like this in the past.  He denies chest pain or shortness of breath.    Social: Patient is here with his father.  He is from [facility].  He does not smoke.  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        I have reviewed the Medications, Allergies, Past Medical and Surgical History, and Social History in the Epic system.     Review of Systems   Constitutional: Negative for fever.   Eyes: Negative.   "  Respiratory: Negative for shortness of breath.    Cardiovascular: Negative for chest pain.   Gastrointestinal: Positive for abdominal pain. Negative for nausea and vomiting.   Genitourinary: Negative.    Musculoskeletal: Negative.    Skin: Negative for rash.   Neurological: Negative for headaches.   Psychiatric/Behavioral: Negative.    All other systems reviewed and are negative.        Physical Exam   BP: 137/75  Pulse: 55  Temp: 97.9  F (36.6  C)  Resp: 16  Height: 175.3 cm (5' 9\")  Weight: 62.3 kg (137 lb 6 oz)  SpO2: 99 %  Physical Exam  Physical Exam   Constitutional:   well nourished, well developed, resting comfortably   HENT:   Head: Normocephalic and atraumatic.   Eyes: Conjunctivae are normal. Pupils are equal, round, and reactive to light.    pharynx has no erythema or exudate, mucous membranes are moist  Neck:   no adenopathy, no bony tenderness  Cardiovascular: regular rate and rhythm without murmurs or gallops  Pulmonary/Chest: Clear to auscultation bilaterally, with no wheezes or retractions. No respiratory distress.  GI: Soft with good bowel sounds.  Periumbilical tenderness, non-distended, with no guarding, no rebound, no peritoneal signs.  No right lower quadrant abdominal pain.  Back:  No bony or CVA tenderness   Musculoskeletal:  no edema or clubbing   Skin: Skin is warm and dry. No rash noted.   Neurological: alert and oriented to person, place, and time. Nonfocal exam  Psychiatric:  normal mood and affect.   ED Course      ED Course      Procedures              Critical Care time:  none                     Results for orders placed or performed during the hospital encounter of 06/20/17 (from the past 24 hour(s))   CBC with platelets differential   Result Value Ref Range     WBC 14.5 (H) 4.0 - 11.0 10e9/L     RBC Count 5.34 4.4 - 5.9 10e12/L     Hemoglobin 16.5 13.3 - 17.7 g/dL     Hematocrit 45.4 40.0 - 53.0 %     MCV 85 78 - 100 fl     MCH 30.9 26.5 - 33.0 pg     MCHC 36.3 31.5 - 36.5 g/dL "     RDW 11.6 10.0 - 15.0 %     Platelet Count 196 150 - 450 10e9/L     Diff Method Automated Method       % Neutrophils 78.7 %     % Lymphocytes 14.6 %     % Monocytes 5.7 %     % Eosinophils 0.6 %     % Basophils 0.2 %     % Immature Granulocytes 0.2 %     Nucleated RBCs 0 0 /100     Absolute Neutrophil 11.4 (H) 1.6 - 8.3 10e9/L     Absolute Lymphocytes 2.1 0.8 - 5.3 10e9/L     Absolute Monocytes 0.8 0.0 - 1.3 10e9/L     Absolute Eosinophils 0.1 0.0 - 0.7 10e9/L     Absolute Basophils 0.0 0.0 - 0.2 10e9/L     Abs Immature Granulocytes 0.0 0 - 0.4 10e9/L     Absolute Nucleated RBC 0.0     Comprehensive metabolic panel   Result Value Ref Range     Sodium 140 133 - 144 mmol/L     Potassium 3.5 3.4 - 5.3 mmol/L     Chloride 103 98 - 110 mmol/L     Carbon Dioxide 26 20 - 32 mmol/L     Anion Gap 11 3 - 14 mmol/L     Glucose 115 (H) 70 - 99 mg/dL     Urea Nitrogen 17 7 - 30 mg/dL     Creatinine 0.83 0.50 - 1.00 mg/dL     GFR Estimate >90  Non  GFR Calc >60 mL/min/1.7m2     GFR Estimate If Black >90   GFR Calc >60 mL/min/1.7m2     Calcium 9.5 8.5 - 10.1 mg/dL     Bilirubin Total 0.6 0.2 - 1.3 mg/dL     Albumin 4.7 3.4 - 5.0 g/dL     Protein Total 8.2 6.8 - 8.8 g/dL     Alkaline Phosphatase 68 65 - 260 U/L     ALT 52 (H) 0 - 50 U/L     AST 17 0 - 35 U/L   Lipase   Result Value Ref Range     Lipase 121 73 - 393 U/L   UA reflex to Microscopic and Culture   Result Value Ref Range     Color Urine Yellow       Appearance Urine Slightly Cloudy       Glucose Urine Negative NEG mg/dL     Bilirubin Urine Negative NEG     Ketones Urine 10 (A) NEG mg/dL     Specific Gravity Urine 1.017 1.003 - 1.035     Blood Urine Negative NEG     pH Urine 6.0 5.0 - 7.0 pH     Protein Albumin Urine Negative NEG mg/dL     Urobilinogen mg/dL Normal 0.0 - 2.0 mg/dL     Nitrite Urine Negative NEG     Leukocyte Esterase Urine Negative NEG     Source Midstream Urine       RBC Urine 1 0 - 2 /HPF     WBC Urine 1 0 - 2 /HPF      Mucous Urine Present (A) NEG /LPF     Amorphous Crystals Few (A) NEG /HPF   US Abdomen Limited     Narrative     ULTRASOUND ABDOMEN LIMITED  6/20/2017 10:08 AM      HISTORY: Periumbilical pain. Rule out appendicitis.      FINDINGS: Ultrasound evaluation right lower quadrant in area of  patient's pain is performed. A dilated noncompressible fluid and  debris-filled tubular structure is noted in the right lower quadrant  in the area of patient's pain likely representing a dilated appendix.  This measures up to 12 mm in thickness. Trace amount of adjacent fluid  is noted. Shadowing calcified appendicolith is noted near the base of  this inflamed appendix. Findings are consistent with acute  appendicitis.     Impression     IMPRESSION: Acute appendicitis.     NELLIE PARNELL MD            Labs Ordered and Resulted from Time of ED Arrival Up to the Time of Departure from the ED   CBC WITH PLATELETS DIFFERENTIAL - Abnormal; Notable for the following:        Result Value      WBC 14.5 (*)       Absolute Neutrophil 11.4 (*)       All other components within normal limits   COMPREHENSIVE METABOLIC PANEL - Abnormal; Notable for the following:      Glucose 115 (*)       ALT 52 (*)       All other components within normal limits   UA MACROSCOPIC WITH REFLEX TO MICRO AND CULTURE - Abnormal; Notable for the following:      Ketones Urine 10 (*)       Mucous Urine Present (*)       Amorphous Crystals Few (*)       All other components within normal limits   LIPASE             Medications   0.9% sodium chloride infusion (not administered)   0.9% sodium chloride BOLUS (1,000 mLs Intravenous New Bag 6/20/17 0841)   ibuprofen (ADVIL/MOTRIN) tablet 600 mg (600 mg Oral Given 6/20/17 0841)         Assessments & Plan (with Medical Decision Making)            I have reviewed the nursing notes.  Emergency Department course:  The patient was seen and examined at 0800 am.   The patient has stomach pain that is periumbilical with no associated  symptoms.  He has no peritoneal signs.  This could obviously represent early appendicitis; however the patient is quite early in presentation.    I treated him with normal saline IV and ibuprofen po.  He then received normal saline at 125 cc an hour  Laboratory studies show  leukocytosis with a WBC of 14.5.  CBC and comprehensive metabolic panel are otherwise unremarkable apart from a slighlty elevated ALT of 52.  Lipase is 121.  UA is negative with 10 ketones.  I reevaluated the patient at about 9:30 AM.  He continued to have abdominal pain it is located periumbilically.  I obtained an ultrasound to see if this would be able to evaluate for acute appendicitis.  Ultrasound shows a dilated appendix measuring up to 12 mm in thickness with an appendicolith.  This is consistent with acute appendicitis.  I consulted surgery and spoke with Dr. Neri.  He will take the patient to the OR directly from the emergency department.  The patient was made NPO.  He was taken to the OR at about 1140 am        I have reviewed the findings, diagnosis, plan and need for follow up with the patient.         New Prescriptions     No medications on file         Final diagnoses:   Acute appendicitis, unspecified acute appendicitis type         6/20/2017   [facility], EMERGENCY DEPARTMENT  This note was created in part by the use of Dragon voice recognition dictation system. Inadvertent grammatical errors and typographical errors may still exist.  MD Court Thayer Alda L, MD  06/20/17 1141         Electronically signed by Shayna Yun MD at 6/20/2017 11:41 AM                               .

## 2017-06-20 NOTE — ANESTHESIA POSTPROCEDURE EVALUATION
Patient: Elena Salguero    Procedure(s):  Laparoscopic Appendectomy - Wound Class: III-Contaminated    Diagnosis:appendicitis  Diagnosis Additional Information: No value filed.    Anesthesia Type:  General, RSI    Note:  Anesthesia Post Evaluation    Patient location during evaluation: PACU  Patient participation: Able to fully participate in evaluation  Level of consciousness: awake and alert  Pain management: adequate  Airway patency: patent  Cardiovascular status: acceptable  Respiratory status: acceptable  Hydration status: acceptable  PONV: none     Anesthetic complications: None          Last vitals:  Vitals:    06/20/17 1200 06/20/17 1420 06/20/17 1430   BP: 126/84 119/64 115/64   Pulse: 60     Resp: 16 17 14   Temp: 36.2  C (97.1  F) 36.8  C (98.2  F)    SpO2: 99% 100% 100%         Electronically Signed By: Pino Rodriguez MD  June 20, 2017  2:54 PM

## 2017-06-21 NOTE — OP NOTE
DATE OF SERVICE:  2017      PREOPERATIVE DIAGNOSIS:  Acute appendicitis.      POSTOPERATIVE DIAGNOSIS:  Acute appendicitis.      OPERATIVE PROCEDURE:  Laparoscopic appendectomy.      SURGEON:  Ozzy Neri MD      ANESTHESIA:  General endotracheal.      INDICATIONS FOR PROCEDURE:  The patient presents with acute appendicitis.  Informed consent was obtained.      OPERATIVE FINDINGS:  Acute suppurative appendicitis, not perforated.      DESCRIPTION OF PROCEDURE:  Kathe Salguero was brought to the operating room, put under general anesthesia, abdomen widely prepped and draped in the usual sterile fashion.  Supraumbilical skin incision was made, Veress needle introduced, abdomen insufflated.  A 5-port placed, noting no injury from needle or trocar.  A 5-port was placed out left lateral under direct vision as well as a 10/12 suprapubic under direct vision.  Using these 3 ports, easily able to visualize a rather elongated and inflamed appendix which was taken down at the mesoappendix using ultrasonic device down to the base.  The base was secured   circumferentially and an Endo-MARIYA stapler was fired to resect the appendix.  Appendix was placed in an Endocatch bag and removed without difficulty.  The abdomen was deflated.  Good hemostasis was noted at the staple line and then the abdomen was deflated.  Ports removed.  Fascial defect closed with 0 Vicryl, skin with subcuticular.  Steri-Strips were applied.  Estimated blood loss was minimal.  The patient tolerated the procedure well and was taken to the recovery room where she was without difficulty or apparent complication.         OZZY NERI MD             D: 2017 15:27   T: 2017 11:53   MT:       Name:     KATHE SALGUERO   MRN:      [id number]        Account:        [id number]   :      1995           Procedure Date: 2017      Document: [id number]       cc: [facility] Surgery Billing

## 2017-06-22 ENCOUNTER — CARE COORDINATION (OUTPATIENT)
Dept: SURGERY | Facility: CLINIC | Age: 22
End: 2017-06-22

## 2017-06-22 LAB — COPATH REPORT: NORMAL

## 2017-06-22 NOTE — PROGRESS NOTES
Elena Salguero is a patient of Dr. Ozzy Neri that underwent a laparoscopic appendectomy approximately 2 days ago.  He is in contact with the clinic at this time for a status update- spoke with father, Letha.   The patient reports that he is up ad siobhan, tolerating  po, afebrile, pain well managed with Norco and discussed weaning to Ibuprofen per package instructions, and his wounds are healing without signs or symptoms or infection.  Elena Salguero denies problems with their bowels and is urinating normally and is slowly returning to their normal routine.    All of his questions were answered including reviewing restrictions and wound care.  He will call this office if he has any further questions and/or concerns.      Pathology reviewed with patient:  No: Not yet available    In lieu of a post-op clinic patient that patient would like to be contacted in approximately 7 days via telephone.    A copy of this note was routed to the primary surgeon.

## 2017-06-30 ENCOUNTER — CARE COORDINATION (OUTPATIENT)
Dept: SURGERY | Facility: CLINIC | Age: 22
End: 2017-06-30

## 2017-06-30 NOTE — PROGRESS NOTES
Kathe Salguero was contacted several days post procedure via telephone for a status update and elected to have a telephone follow -up call approximately 8 days after original contact in lieu of a clinic visit with Dr. Ozzy Neri.  Number provided is the patients father- there is no phone for the patient.  The patients father states that he continues to do well and the pelvic incision remains sore.  The patients wounds are healed and the area is C/D/I.  He is afebrile and claribel po; the patient is slowly resuming his normal activity.   By report, the patient works at [employer] and he may need a letter to RTW.  Patient can return to work with lifting restrictions- asked father to have patient call office if he needs a letter.      Patient will call this office if he would like to arrange a post op visit but at this time his father has declined.  Contact information provided.      Pathology:  Copath Report 06/20/2017  1:35 PM 88   Patient Name: KATHE SALGUERO   MR#: [id number]   Specimen #: [id number]   Collected: 6/20/2017   Received: 6/20/2017   Reported: 6/22/2017 14:38   Ordering Phy(s): OZZY NERI     For improved result formatting, select 'View Enhanced Report Format'   under Linked Documents section.     SPECIMEN(S):   Appendix     FINAL DIAGNOSIS:   APPENDIX, APPENDECTOMY:   - Acute appendicitis with serositis

## 2017-07-10 ENCOUNTER — CARE COORDINATION (OUTPATIENT)
Dept: SURGERY | Facility: CLINIC | Age: 22
End: 2017-07-10

## 2017-07-10 NOTE — PROGRESS NOTES
Patient requesting a report stating he had surgery 6/20. He would like the operative report emailed to [email or web address]. He said he may need other paperwork filled out. He was off work for 3 weeks, from 6/20-7/9. He is returning to work today, 7/10 with no restrictions. Paperwork has been emailed.

## 2017-07-17 ENCOUNTER — CARE COORDINATION (OUTPATIENT)
Dept: SURGERY | Facility: CLINIC | Age: 22
End: 2017-07-17

## 2017-07-17 NOTE — PROGRESS NOTES
Called to speak with patient regarding his STD paperwork. His father states he was off work from 6/20-7/9. He returned to work 7/10 without any restrictions.

## 2020-05-24 ENCOUNTER — HOSPITAL ENCOUNTER (EMERGENCY)
Facility: CLINIC | Age: 25
End: 2020-05-24
Payer: COMMERCIAL

## (undated) DEVICE — ENDO SCOPE WARMER LF TM500

## (undated) DEVICE — STRAP KNEE/BODY 31143004

## (undated) DEVICE — SU MONOCRYL 4-0 PS-2 18" UND Y496G

## (undated) DEVICE — SOL NACL 0.9% IRRIG 1000ML BOTTLE 2F7124

## (undated) DEVICE — SUCTION MANIFOLD DORNOCH ULTRA CART UL-CL500

## (undated) DEVICE — LINEN ORTHO PACK 5446

## (undated) DEVICE — LINEN POUCH DBL 5427

## (undated) DEVICE — COVER CAMERA IN-LIGHT DISP LT-C02

## (undated) DEVICE — ENDO TROCAR 05MM VERSASTEP VS101005

## (undated) DEVICE — GLOVE PROTEXIS W/NEU-THERA 7.0  2D73TE70

## (undated) DEVICE — Device

## (undated) DEVICE — SU VICRYL 0 CT-2 27" J334H

## (undated) DEVICE — ENDO TROCAR 12MM VERSASTEP VS101012P

## (undated) DEVICE — ENDO TROCAR 12MM VERSASTEP EXP SLEEVE VS101000

## (undated) DEVICE — TUBING INSUFFLATION W/FILTER 10FT GS1016

## (undated) DEVICE — ENDO POUCH GOLD 10MM ECATCH 173050G

## (undated) DEVICE — LIGHT HANDLE X2

## (undated) DEVICE — DECANTER TRANSFER DEVICE 2008S

## (undated) DEVICE — STPL ENDO HANDLE GIA ULTRA UNIVERSAL STD EGIAUSTND

## (undated) DEVICE — LINEN GOWN X4 5410

## (undated) DEVICE — GLOVE PROTEXIS W/NEU-THERA 7.5  2D73TE75

## (undated) DEVICE — ESU GROUND PAD UNIVERSAL W/O CORD

## (undated) DEVICE — GLOVE PROTEXIS BLUE W/NEU-THERA 7.5  2D73EB75

## (undated) DEVICE — ESU DISSECTOR SONICISION CORDLESS 39CM SCD396

## (undated) DEVICE — NDL INSUFFLATION 14GA STEP S100000

## (undated) DEVICE — PREP CHLORAPREP 26ML TINTED ORANGE  260815

## (undated) DEVICE — BLADE CLIPPER SGL USE 9680

## (undated) DEVICE — STPL ENDO RELOAD 45MM VASCULAR MEDIUM TAN EGIA45AVM

## (undated) RX ORDER — CEFAZOLIN SODIUM 2 G/100ML
INJECTION, SOLUTION INTRAVENOUS
Status: DISPENSED
Start: 2017-06-20

## (undated) RX ORDER — FENTANYL CITRATE 50 UG/ML
INJECTION, SOLUTION INTRAMUSCULAR; INTRAVENOUS
Status: DISPENSED
Start: 2017-06-20